# Patient Record
Sex: FEMALE
[De-identification: names, ages, dates, MRNs, and addresses within clinical notes are randomized per-mention and may not be internally consistent; named-entity substitution may affect disease eponyms.]

---

## 2020-01-25 ENCOUNTER — NURSE TRIAGE (OUTPATIENT)
Dept: OTHER | Facility: CLINIC | Age: 35
End: 2020-01-25

## 2020-11-09 ENCOUNTER — NURSE TRIAGE (OUTPATIENT)
Dept: OTHER | Facility: CLINIC | Age: 35
End: 2020-11-09

## 2020-11-10 NOTE — TELEPHONE ENCOUNTER
Pt states that she was recently dx with COVID. Pt states her symptoms of SOB, muscle pain, severe headache, weakness, joint pain, cough loss of smell and taste and runny nose started last Monday 11/2/20. Pt states she is has been becoming increasingly SOB within the last 2 hours. Pt states she does have a history of Asthma and a history of a DVT x2. Pt recommended disposition is for pt to be seen in the ED now. Pt verbalizes understanding and agrees with plan. See assessment below    Caller provided care advice and instructed to call back with worsening symptoms. Attention provider: Your patient utilized nurse triage services offered by employer, payer or community. This encounter includes an overview of the reason for call, assessment and recommended disposition. Please do not respond through this encounter as the response is not directed to a shared pool. Reason for Disposition   [1] MODERATE difficulty breathing (e.g., speaks in phrases, SOB even at rest, pulse 100-120) AND [2] NEW-onset or WORSE than normal    Answer Assessment - Initial Assessment Questions  1. RESPIRATORY STATUS: \"Describe your breathing? \" (e.g., wheezing, shortness of breath, unable to speak, severe coughing)      Pt states she states when she talks phrases or tell long story she becomes SOB     2. ONSET: \"When did this breathing problem begin? \"      Pt states she symptoms started last Monday     3. PATTERN \"Does the difficult breathing come and go, or has it been constant since it started? \"       Pt states it comes and goes in the last two hours become more worse    4. SEVERITY: \"How bad is your breathing? \" (e.g., mild, moderate, severe)     - MILD: No SOB at rest, mild SOB with walking, speaks normally in sentences, can lay down, no retractions, pulse < 100.     - MODERATE: SOB at rest, SOB with minimal exertion and prefers to sit, cannot lie down flat, speaks in phrases, mild retractions, audible wheezing, pulse 100-120.     -